# Patient Record
Sex: FEMALE | Race: WHITE | NOT HISPANIC OR LATINO | RURAL
[De-identification: names, ages, dates, MRNs, and addresses within clinical notes are randomized per-mention and may not be internally consistent; named-entity substitution may affect disease eponyms.]

---

## 2020-08-13 ENCOUNTER — TELEPHONE ENCOUNTER (OUTPATIENT)
Dept: RURAL CLINIC 8 | Facility: CLINIC | Age: 55
End: 2020-08-13

## 2020-10-07 ENCOUNTER — CLAIMS CREATED FROM THE CLAIM WINDOW (OUTPATIENT)
Dept: RURAL CLINIC 8 | Facility: CLINIC | Age: 55
End: 2020-10-07
Payer: MEDICARE

## 2020-10-07 ENCOUNTER — OFFICE VISIT (OUTPATIENT)
Dept: RURAL CLINIC 8 | Facility: CLINIC | Age: 55
End: 2020-10-07
Payer: MEDICARE

## 2020-10-07 DIAGNOSIS — R00.0 POTS (POSTURAL ORTHOSTATIC TACHYCARDIA SYNDROME): ICD-10-CM

## 2020-10-07 DIAGNOSIS — Z86.010 HISTORY OF COLON POLYPS: ICD-10-CM

## 2020-10-07 PROCEDURE — 99213 OFFICE O/P EST LOW 20 MIN: CPT | Performed by: INTERNAL MEDICINE

## 2020-10-07 PROCEDURE — G8427 DOCREV CUR MEDS BY ELIG CLIN: HCPCS | Performed by: INTERNAL MEDICINE

## 2020-10-07 PROCEDURE — G8417 CALC BMI ABV UP PARAM F/U: HCPCS | Performed by: INTERNAL MEDICINE

## 2020-10-07 PROCEDURE — 3017F COLORECTAL CA SCREEN DOC REV: CPT | Performed by: INTERNAL MEDICINE

## 2020-10-07 PROCEDURE — 1036F TOBACCO NON-USER: CPT | Performed by: INTERNAL MEDICINE

## 2020-10-07 PROCEDURE — G8484 FLU IMMUNIZE NO ADMIN: HCPCS | Performed by: INTERNAL MEDICINE

## 2020-10-07 PROCEDURE — G9903 PT SCRN TBCO ID AS NON USER: HCPCS | Performed by: INTERNAL MEDICINE

## 2020-10-07 RX ORDER — SODIUM, POTASSIUM,MAG SULFATES 17.5-3.13G
177 ML SOLUTION, RECONSTITUTED, ORAL ORAL
Qty: 1 KIT | Refills: 0 | OUTPATIENT
Start: 2020-10-07

## 2020-10-07 RX ORDER — CLONAZEPAM 1 MG/1
TABLET ORAL
Qty: 0 | Refills: 0 | COMMUNITY
Start: 1900-01-01

## 2020-10-07 RX ORDER — BISACODYL 5 MG
TAKE 4 TABLET, DELAYED RELEASE (ENTERIC COATED) ORAL
Qty: 4 | OUTPATIENT
Start: 2020-10-07 | End: 2020-10-08

## 2020-10-07 NOTE — HPI-TODAY'S VISIT:
pt comes for follow up. had EGD 9/2018 with empiric dilation of UES for dysphagia. she had a physical in July in [place]. She had a colonscopy in 2015. she had a few small polyps removed. she thinks that she does have some hemorrhoid or tear. if she strains, she will have a burning sensation. very rare bleeding. no FH of colon cancer.

## 2020-11-04 ENCOUNTER — TELEPHONE ENCOUNTER (OUTPATIENT)
Dept: URBAN - METROPOLITAN AREA CLINIC 92 | Facility: CLINIC | Age: 55
End: 2020-11-04

## 2020-12-10 ENCOUNTER — CLAIMS CREATED FROM THE CLAIM WINDOW (OUTPATIENT)
Dept: RURAL MEDICAL CENTER 4 | Facility: MEDICAL CENTER | Age: 55
End: 2020-12-10
Payer: MEDICARE

## 2020-12-10 ENCOUNTER — OFFICE VISIT (OUTPATIENT)
Dept: RURAL MEDICAL CENTER 4 | Facility: MEDICAL CENTER | Age: 55
End: 2020-12-10
Payer: MEDICARE

## 2020-12-10 DIAGNOSIS — Z86.010 H/O ADENOMATOUS POLYP OF COLON: ICD-10-CM

## 2020-12-10 PROCEDURE — G9936 PMH PLYP/NEO CO/RECT/JUN/ANS: HCPCS | Performed by: INTERNAL MEDICINE

## 2020-12-10 PROCEDURE — G0105 COLORECTAL SCRN; HI RISK IND: HCPCS | Performed by: INTERNAL MEDICINE

## 2021-03-26 ENCOUNTER — CLAIMS CREATED FROM THE CLAIM WINDOW (OUTPATIENT)
Dept: RURAL CLINIC 8 | Facility: CLINIC | Age: 56
End: 2021-03-26
Payer: MEDICARE

## 2021-03-26 ENCOUNTER — OFFICE VISIT (OUTPATIENT)
Dept: RURAL CLINIC 8 | Facility: CLINIC | Age: 56
End: 2021-03-26
Payer: MEDICARE

## 2021-03-26 DIAGNOSIS — R13.19 OTHER DYSPHAGIA: ICD-10-CM

## 2021-03-26 DIAGNOSIS — R07.89 ACUTE CHEST WALL PAIN: ICD-10-CM

## 2021-03-26 PROBLEM — 40739000: Status: ACTIVE | Noted: 2021-03-26

## 2021-03-26 PROBLEM — 30233002: Status: ACTIVE | Noted: 2021-03-26

## 2021-03-26 PROCEDURE — 99213 OFFICE O/P EST LOW 20 MIN: CPT | Performed by: INTERNAL MEDICINE

## 2021-03-26 RX ORDER — CLONAZEPAM 1 MG/1
TABLET ORAL
Qty: 0 | Refills: 0 | Status: ACTIVE | COMMUNITY
Start: 1900-01-01

## 2021-03-26 RX ORDER — SODIUM, POTASSIUM,MAG SULFATES 17.5-3.13G
177 ML SOLUTION, RECONSTITUTED, ORAL ORAL
Qty: 1 KIT | Refills: 0 | Status: DISCONTINUED | COMMUNITY
Start: 2020-10-07

## 2021-03-26 NOTE — HPI-TODAY'S VISIT:
pt reports that over the last two weeks she has had bad heartburn. she says that it feels like something is stuck in the esophagus. it burns going down. she thinks her throat is irritated and swollen. it hurts through to her back. - has tried TUMS and vinegar. she belched up some oily eggs with hamburger meat. she is more holistic and does not want to take a medicine to cover up the symptom but understand why it is wrong with her stomach to begin with

## 2022-07-06 ENCOUNTER — OFFICE VISIT (OUTPATIENT)
Dept: URBAN - METROPOLITAN AREA CLINIC 105 | Facility: CLINIC | Age: 57
End: 2022-07-06

## 2022-10-08 ENCOUNTER — OFFICE VISIT (OUTPATIENT)
Dept: RURAL CLINIC 2 | Facility: CLINIC | Age: 57
End: 2022-10-08

## 2022-10-08 ENCOUNTER — OFFICE VISIT (OUTPATIENT)
Dept: RURAL CLINIC 2 | Facility: CLINIC | Age: 57
End: 2022-10-08
Payer: MEDICARE

## 2022-10-08 VITALS
SYSTOLIC BLOOD PRESSURE: 114 MMHG | DIASTOLIC BLOOD PRESSURE: 78 MMHG | HEIGHT: 70 IN | TEMPERATURE: 97.5 F | BODY MASS INDEX: 24.34 KG/M2 | WEIGHT: 170 LBS | HEART RATE: 65 BPM

## 2022-10-08 DIAGNOSIS — K59.01 CONSTIPATION BY DELAYED COLONIC TRANSIT: ICD-10-CM

## 2022-10-08 DIAGNOSIS — R10.32 LLQ ABDOMINAL PAIN: ICD-10-CM

## 2022-10-08 DIAGNOSIS — Z86.010 HISTORY OF COLON POLYPS: ICD-10-CM

## 2022-10-08 PROBLEM — 428283002 HISTORY OF POLYP OF COLON: Status: ACTIVE | Noted: 2020-10-07

## 2022-10-08 PROBLEM — 429699009: Status: ACTIVE | Noted: 2022-10-08

## 2022-10-08 PROBLEM — 35298007: Status: ACTIVE | Noted: 2022-10-08

## 2022-10-08 PROCEDURE — 99214 OFFICE O/P EST MOD 30 MIN: CPT | Performed by: INTERNAL MEDICINE

## 2022-10-08 RX ORDER — CLONAZEPAM 1 MG/1
TABLET ORAL
Qty: 0 | Refills: 0 | Status: ACTIVE | COMMUNITY
Start: 1900-01-01

## 2022-10-08 RX ORDER — POLYETHYLENE GLYCOL 3350 17 G
1 PACKET MIXED WITH 8 OUNCES OF FLUID POWDER IN PACKET (EA) ORAL ONCE A DAY
Qty: 30 | Refills: 3 | OUTPATIENT

## 2022-10-08 RX ORDER — HYOSCYAMINE SULFATE 0.12 MG/1
1 TABLET TABLET SUBLINGUAL
Qty: 30 | Refills: 2 | OUTPATIENT
Start: 2022-10-08 | End: 2023-01-05

## 2022-10-08 NOTE — HPI-TODAY'S VISIT:
pt reports that over the last two weeks she has had bad heartburn. she says that it feels like something is stuck in the esophagus. it burns going down. she thinks her throat is irritated and swollen. it hurts through to her back. - has tried TUMS and vinegar. she belched up some oily eggs with hamburger meat. she is more holistic and does not want to take a medicine to cover up the symptom but understand why it is wrong with her stomach to begin with -  10/08/2022:  Patient last seen for belching and or odynophagia and heartburn.  Upper GI endoscopy was recommended.  More recently she has had pain in a left lower quadrant.  CT scan shows no evidence of diverticulitis or any other radiographic explanation for her symptoms.  I did her colonoscopy in December of 2020 for polyp surveillance and it was normal without diverticulosis. - pt reports having pain in the LLQ. it started on  and off about  2 years ago. comes and goes. she says t hat it gets to the point that it gets up to a 9/10.

## 2022-10-26 ENCOUNTER — OFFICE VISIT (OUTPATIENT)
Dept: RURAL CLINIC 8 | Facility: CLINIC | Age: 57
End: 2022-10-26

## 2023-12-18 ENCOUNTER — OFFICE VISIT (OUTPATIENT)
Dept: RURAL CLINIC 2 | Facility: CLINIC | Age: 58
End: 2023-12-18
Payer: MEDICARE

## 2023-12-18 ENCOUNTER — LAB OUTSIDE AN ENCOUNTER (OUTPATIENT)
Dept: RURAL CLINIC 2 | Facility: CLINIC | Age: 58
End: 2023-12-18

## 2023-12-18 ENCOUNTER — CLAIMS CREATED FROM THE CLAIM WINDOW (OUTPATIENT)
Dept: RURAL CLINIC 2 | Facility: CLINIC | Age: 58
End: 2023-12-18
Payer: MEDICARE

## 2023-12-18 ENCOUNTER — DASHBOARD ENCOUNTERS (OUTPATIENT)
Age: 58
End: 2023-12-18

## 2023-12-18 VITALS
DIASTOLIC BLOOD PRESSURE: 88 MMHG | BODY MASS INDEX: 22.9 KG/M2 | HEIGHT: 70 IN | SYSTOLIC BLOOD PRESSURE: 122 MMHG | TEMPERATURE: 96.9 F | WEIGHT: 160 LBS | HEART RATE: 75 BPM

## 2023-12-18 DIAGNOSIS — R10.12 LEFT UPPER QUADRANT PAIN: ICD-10-CM

## 2023-12-18 DIAGNOSIS — R14.0 POSTPRANDIAL BLOATING: ICD-10-CM

## 2023-12-18 DIAGNOSIS — R00.2 HEART PALPITATIONS: ICD-10-CM

## 2023-12-18 DIAGNOSIS — R12 HEARTBURN: ICD-10-CM

## 2023-12-18 PROBLEM — 116289008: Status: ACTIVE | Noted: 2023-12-18

## 2023-12-18 PROBLEM — 80313002: Status: ACTIVE | Noted: 2023-12-18

## 2023-12-18 PROBLEM — 301715003: Status: ACTIVE | Noted: 2023-12-18

## 2023-12-18 PROCEDURE — 99213 OFFICE O/P EST LOW 20 MIN: CPT | Performed by: NURSE PRACTITIONER

## 2023-12-18 RX ORDER — CLONAZEPAM 1 MG/1
TABLET ORAL
Qty: 0 | Refills: 0 | Status: ACTIVE | COMMUNITY
Start: 1900-01-01

## 2023-12-18 RX ORDER — POLYETHYLENE GLYCOL 3350 17 G
1 PACKET MIXED WITH 8 OUNCES OF FLUID POWDER IN PACKET (EA) ORAL ONCE A DAY
Qty: 30 | Refills: 3 | Status: DISCONTINUED | COMMUNITY

## 2023-12-18 NOTE — HPI-ZZZTODAY'S VISIT
1218/2023 The patient who is known to our practice is here today with complaints of mild left upper quadrant pain occurring intermittently, dull and achy in quality and not necessarily associated with eating,.  She is also complaining of intermittent heartburn and postprandial bloating.  She is currently using apple cider vinegar diluted in water and is helping her heartburn.  She is also adhering to an antireflux diet.  She underwent an upper endoscopy in October 2018 for esophageal dysphagia heartburn and dyspepsia with findings of a normal esophagus without evidence of stricture.  Empiric dilation completed to 20 mm which evidently resolved her difficulty swallowing.  Other findings include small hiatal hernia and fundic gland polyps.